# Patient Record
Sex: FEMALE | Race: BLACK OR AFRICAN AMERICAN | URBAN - METROPOLITAN AREA
[De-identification: names, ages, dates, MRNs, and addresses within clinical notes are randomized per-mention and may not be internally consistent; named-entity substitution may affect disease eponyms.]

---

## 2023-07-23 ENCOUNTER — HOSPITAL ENCOUNTER (EMERGENCY)
Age: 43
Discharge: HOME | End: 2023-07-23

## 2023-07-23 VITALS
TEMPERATURE: 97.7 F | SYSTOLIC BLOOD PRESSURE: 108 MMHG | HEART RATE: 89 BPM | RESPIRATION RATE: 18 BRPM | WEIGHT: 139.5 LBS | OXYGEN SATURATION: 100 % | DIASTOLIC BLOOD PRESSURE: 79 MMHG

## 2023-07-23 DIAGNOSIS — B00.9 HERPES SIMPLEX: Primary | ICD-10-CM

## 2023-07-23 PROCEDURE — 99283 EMERGENCY DEPT VISIT LOW MDM: CPT

## 2023-07-23 RX ORDER — NAPROXEN 500 MG/1
500 TABLET ORAL 2 TIMES DAILY PRN
Qty: 20 TABLET | Refills: 0 | Status: SHIPPED | OUTPATIENT
Start: 2023-07-23

## 2023-07-23 RX ORDER — VALACYCLOVIR HYDROCHLORIDE 1 G/1
1000 TABLET, FILM COATED ORAL 3 TIMES DAILY
Qty: 21 TABLET | Refills: 0 | Status: SHIPPED | OUTPATIENT
Start: 2023-07-23 | End: 2023-07-30

## 2023-07-23 ASSESSMENT — ENCOUNTER SYMPTOMS
RESPIRATORY NEGATIVE: 1
EYES NEGATIVE: 1
GASTROINTESTINAL NEGATIVE: 1
COLOR CHANGE: 0

## 2023-07-23 ASSESSMENT — PAIN SCALES - GENERAL: PAINLEVEL_OUTOF10: 8

## 2023-07-23 ASSESSMENT — PAIN DESCRIPTION - LOCATION: LOCATION: BUTTOCKS

## 2023-07-23 ASSESSMENT — PAIN DESCRIPTION - ORIENTATION: ORIENTATION: LEFT

## 2023-07-23 ASSESSMENT — PAIN - FUNCTIONAL ASSESSMENT: PAIN_FUNCTIONAL_ASSESSMENT: 0-10

## 2023-07-23 NOTE — ED PROVIDER NOTES
[facility]        Pt Name: Fredy Cevallos  MRN: [id number]  [address] [facility] [place] 1980  Date of evaluation: 7/23/2023  Provider: Laquita Ernandez PA-C  PCP: No primary care provider on file. Note Started: 9:35 AM EDT 7/23/23      IAN. I have evaluated this patient. CHIEF COMPLAINT       Chief Complaint   Patient presents with    Wound Check     Pt reports irritated area to left inside of butt cheek since yesterday, reports area is painful. HISTORY OF PRESENT ILLNESS: 1 or more Elements     History From: patient    Fredy Cevallos is a 43 y.o. female who presents complaining of a painful rash to the left cheek since yesterday. Patient states she has never had this before and is not taking medication for it. Denies seeing a bug or feeling a bite. denies fever, drainage, painful bowel movements, blood in stool. Denies pregnancy. Nursing Notes were all reviewed and agreed with or any disagreements were addressed in the HPI. REVIEW OF SYSTEMS :      Review of Systems   Constitutional: Negative. HENT: Negative. Eyes: Negative. Respiratory: Negative. Cardiovascular: Negative. Gastrointestinal: Negative. Genitourinary: Negative. Musculoskeletal: Negative. Skin:  Positive for rash. Negative for color change, pallor and wound. Neurological: Negative. Hematological: Negative. Positives and Pertinent negatives as per HPI. PAST MEDICAL HISTORY    has no past medical history on file. SURGICAL HISTORY   History reviewed. No pertinent surgical history. CURRENTMEDICATIONS       Previous Medications    No medications on file       ALLERGIES     Patient has no known allergies. FAMILYHISTORY     History reviewed. No pertinent family history.      SOCIAL HISTORY       Social History     Tobacco Use    Smoking status: Never    Smokeless tobacco: Never   Substance Use Topics    Alcohol use: Not